# Patient Record
Sex: MALE | Race: BLACK OR AFRICAN AMERICAN | NOT HISPANIC OR LATINO | Employment: OTHER | URBAN - NONMETROPOLITAN AREA
[De-identification: names, ages, dates, MRNs, and addresses within clinical notes are randomized per-mention and may not be internally consistent; named-entity substitution may affect disease eponyms.]

---

## 2019-04-16 ENCOUNTER — TELEPHONE (OUTPATIENT)
Dept: URGENT CARE | Facility: CLINIC | Age: 34
End: 2019-04-16

## 2019-04-16 DIAGNOSIS — L03.012 PARONYCHIA OF LEFT INDEX FINGER: ICD-10-CM

## 2019-04-16 PROCEDURE — 87185 SC STD ENZYME DETCJ PER NZM: CPT | Performed by: NURSE PRACTITIONER

## 2019-04-16 PROCEDURE — 87147 CULTURE TYPE IMMUNOLOGIC: CPT | Performed by: NURSE PRACTITIONER

## 2019-04-16 PROCEDURE — 87205 SMEAR GRAM STAIN: CPT | Performed by: NURSE PRACTITIONER

## 2019-04-16 PROCEDURE — 87186 SC STD MICRODIL/AGAR DIL: CPT | Performed by: NURSE PRACTITIONER

## 2019-04-16 PROCEDURE — 87070 CULTURE OTHR SPECIMN AEROBIC: CPT | Performed by: NURSE PRACTITIONER

## 2019-04-16 RX ORDER — CLINDAMYCIN HYDROCHLORIDE 300 MG/1
300 CAPSULE ORAL 3 TIMES DAILY
Qty: 30 CAPSULE | Refills: 0 | Status: SHIPPED | OUTPATIENT
Start: 2019-04-16 | End: 2020-12-12

## 2019-04-16 RX ORDER — NAPROXEN 500 MG/1
500 TABLET ORAL 2 TIMES DAILY PRN
Qty: 30 TABLET | Refills: 0 | Status: SHIPPED | OUTPATIENT
Start: 2019-04-16 | End: 2020-12-12

## 2020-12-12 PROCEDURE — 87661 TRICHOMONAS VAGINALIS AMPLIF: CPT | Performed by: NURSE PRACTITIONER

## 2020-12-12 PROCEDURE — 87491 CHLMYD TRACH DNA AMP PROBE: CPT | Performed by: NURSE PRACTITIONER

## 2020-12-12 PROCEDURE — 87591 N.GONORRHOEAE DNA AMP PROB: CPT | Performed by: NURSE PRACTITIONER

## 2020-12-12 PROCEDURE — 87086 URINE CULTURE/COLONY COUNT: CPT | Performed by: NURSE PRACTITIONER

## 2021-02-05 ENCOUNTER — HOSPITAL ENCOUNTER (EMERGENCY)
Age: 36
Discharge: HOME | End: 2021-02-05
Attending: EMERGENCY MEDICINE
Payer: MEDICAID

## 2021-02-05 VITALS
HEART RATE: 59 BPM | OXYGEN SATURATION: 95 % | BODY MASS INDEX: 29.4 KG/M2 | HEIGHT: 71 IN | RESPIRATION RATE: 16 BRPM | DIASTOLIC BLOOD PRESSURE: 97 MMHG | TEMPERATURE: 97.7 F | SYSTOLIC BLOOD PRESSURE: 137 MMHG | WEIGHT: 210 LBS

## 2021-02-05 DIAGNOSIS — S05.02XA CORNEAL ABRASION, LEFT, INITIAL ENCOUNTER: Primary | ICD-10-CM

## 2021-02-05 PROCEDURE — 99284 EMERGENCY DEPT VISIT MOD MDM: CPT

## 2021-02-05 PROCEDURE — 6370000000 HC RX 637 (ALT 250 FOR IP): Performed by: EMERGENCY MEDICINE

## 2021-02-05 RX ORDER — POLYMYXIN B SULFATE AND TRIMETHOPRIM 1; 10000 MG/ML; [USP'U]/ML
1 SOLUTION OPHTHALMIC EVERY 4 HOURS
Qty: 1 BOTTLE | Refills: 0 | Status: SHIPPED | OUTPATIENT
Start: 2021-02-05 | End: 2021-02-12

## 2021-02-05 RX ORDER — TETRACAINE HYDROCHLORIDE 5 MG/ML
1 SOLUTION OPHTHALMIC ONCE
Status: COMPLETED | OUTPATIENT
Start: 2021-02-05 | End: 2021-02-05

## 2021-02-05 RX ADMIN — FLUORESCEIN SODIUM 1 MG: 1 STRIP OPHTHALMIC at 06:44

## 2021-02-05 RX ADMIN — TETRACAINE HYDROCHLORIDE 1 DROP: 5 SOLUTION OPHTHALMIC at 06:44

## 2021-02-05 SDOH — HEALTH STABILITY: MENTAL HEALTH: HOW OFTEN DO YOU HAVE A DRINK CONTAINING ALCOHOL?: NEVER

## 2021-02-05 ASSESSMENT — TONOMETRY
OD_IOP_MMHG: 16
IOP_HANDHELD: 1
OS_IOP_MMHG: 18

## 2021-02-05 ASSESSMENT — ENCOUNTER SYMPTOMS
EYE PAIN: 1
EYE ITCHING: 0
SINUS PRESSURE: 0
EYE DISCHARGE: 1
VOMITING: 0
RHINORRHEA: 0
EYE REDNESS: 1

## 2021-02-05 ASSESSMENT — VISUAL ACUITY: OU: 1

## 2021-02-05 ASSESSMENT — PAIN DESCRIPTION - ORIENTATION: ORIENTATION: LEFT

## 2021-02-05 ASSESSMENT — PAIN DESCRIPTION - PAIN TYPE: TYPE: ACUTE PAIN

## 2021-02-05 ASSESSMENT — PAIN DESCRIPTION - LOCATION: LOCATION: EYE

## 2021-02-05 NOTE — ED PROVIDER NOTES
[facility] EMERGENCY DEPT  eMERGENCY dEPARTMENT eNCOUnter      Pt Name: Armando Zarate  MRN: [id number]  [place] 1985  Date of evaluation: 2/5/2021  Provider: Jarrett Quiroz MD    [address] [place] [address]       Chief Complaint   Patient presents with    Eye Problem     left eye pain and redness. Possible foreign object         HISTORY OF PRESENT ILLNESS   (Location/Symptom, Timing/Onset,Context/Setting, Quality, Duration, Modifying Factors, Severity)  Note limiting factors. Armando Zarate is a 38 y.o. male who presents to the emergency department for left eye pain. Patient states that he was grinding some metal but was wearing safety glasses yesterday but concerned he possibly has a piece of metal stuck in his eye. He woke up this morning and noticed it was very red and he had some even reddish colored watery discharge. States initially his vision was a little blurry in the left eye but that has since resolved. Tells me has been diagnosed with glaucoma but he has been compliant with his drops. Wears glasses no contacts. HPI    NursingNotes were reviewed. REVIEW OF SYSTEMS    (2-9 systems for level 4, 10 or more for level 5)     Review of Systems   Constitutional: Negative for fever. HENT: Negative for rhinorrhea and sinus pressure. Eyes: Positive for pain, discharge, redness and visual disturbance. Negative for itching. Gastrointestinal: Negative for vomiting. Neurological: Negative for headaches. PAST MEDICALHISTORY   History reviewed. No pertinent past medical history. SURGICAL HISTORY     History reviewed. No pertinent surgical history. CURRENT MEDICATIONS     Previous Medications    No medications on file       ALLERGIES     Patient has no known allergies. FAMILY HISTORY     History reviewed. No pertinent family history.        SOCIAL HISTORY       Social History     Socioeconomic History    Marital status: Single     Spouse name: None    Number of children: None    Years of education: None    Highest education level: None   Occupational History    None   Social Needs    Financial resource strain: None    Food insecurity     Worry: None     Inability: None    Transportation needs     Medical: None     Non-medical: None   Tobacco Use    Smoking status: Never Smoker    Smokeless tobacco: Never Used   Substance and Sexual Activity    Alcohol use: Never     Frequency: Never    Drug use: Never    Sexual activity: None   Lifestyle    Physical activity     Days per week: None     Minutes per session: None    Stress: None   Relationships    Social connections     Talks on phone: None     Gets together: None     Attends Faith service: None     Active member of club or organization: None     Attends meetings of clubs or organizations: None     Relationship status: None    Intimate partner violence     Fear of current or ex partner: None     Emotionally abused: None     Physically abused: None     Forced sexual activity: None   Other Topics Concern    None   Social History Narrative    None       SCREENINGS    Anastasiia Coma Scale  Eye Opening: Spontaneous  Best Verbal Response: Oriented  Best Motor Response: Obeys commands  Anastasiia Coma Scale Score: 15        PHYSICAL EXAM    (up to 7 for level 4, 8 or more for level 5)     ED Triage Vitals [02/05/21 0634]   BP Temp Temp src Pulse Resp SpO2 Height Weight   (!) 137/97 97.7 °F (36.5 °C) -- 59 16 95 % 5' 11\" (1.803 m) 210 lb (95.3 kg)       Physical Exam  Vitals signs and nursing note reviewed. Constitutional:       General: He is not in acute distress. Appearance: Normal appearance. He is well-developed. He is not ill-appearing, toxic-appearing or diaphoretic. HENT:      Head: Normocephalic and atraumatic. Eyes:      General: Lids are normal. Lids are everted, no foreign bodies appreciated. Vision grossly intact. Gaze aligned appropriately. Right eye: No foreign body or discharge.          Left eye: No foreign body or discharge. Intraocular pressure: Right eye pressure is 16 mmHg. Left eye pressure is 18 mmHg. Measurements were taken using a handheld tonometer. Extraocular Movements: Extraocular movements intact. Conjunctiva/sclera:      Right eye: No hemorrhage. Left eye: Left conjunctiva is injected. No hemorrhage. Pupils: Pupils are equal, round, and reactive to light. Left eye: Corneal abrasion and fluorescein uptake present. Comments: Somewhat diffuse haziness uptake of fluorescein across cornea   Neck:      Musculoskeletal: Normal range of motion and neck supple. Trachea: No tracheal deviation. Cardiovascular:      Rate and Rhythm: Normal rate and regular rhythm. Heart sounds: Normal heart sounds. No murmur. Pulmonary:      Breath sounds: Normal breath sounds. No wheezing or rales. Musculoskeletal: Normal range of motion. Skin:     General: Skin is warm and dry. Neurological:      Mental Status: He is alert and oriented to person, place, and time. DIAGNOSTIC RESULTS             No orders to display           LABS:  Labs Reviewed - No data to display    All other labs were within normal range or not returned as of this dictation. EMERGENCY DEPARTMENT COURSE and DIFFERENTIAL DIAGNOSIS/MDM:   Vitals:    Vitals:    02/05/21 0634   BP: (!) 137/97   Pulse: 59   Resp: 16   Temp: 97.7 °F (36.5 °C)   SpO2: 95%   Weight: 210 lb (95.3 kg)   Height: 5' 11\" (1.803 m)       MDM    No FB, pressures are normal, gross vision intact, possibly had FB and due to rubbing eyes has caused corneal abrasion, place on polytrim gtt and f/u ophtho, understands return precautions      CONSULTS:  None    PROCEDURES:  Unless otherwise noted below, none     Procedures    FINAL IMPRESSION      1.  Corneal abrasion, left, initial encounter          DISPOSITION/PLAN   DISPOSITION Decision To Discharge 02/05/2021 06:58:58 AM      PATIENT REFERRED TO:  Natividad Davis MD  [id number] [phone]    Schedule an appointment as soon as possible for a visit on 2/8/2021        DISCHARGE MEDICATIONS:  New Prescriptions    TRIMETHOPRIM-POLYMYXIN B (POLYTRIM) 11423-5.1 UNIT/ML-% OPHTHALMIC SOLUTION    Place 1 drop into the left eye every 4 hours for 7 days          (Please note that portions of this note were completed with a voice recognition program.  Efforts were made to edit thedictations but occasionally words are mis-transcribed.)    Tanja Arevalo MD (electronically signed)  Attending Emergency Physician       Zacarias Acosta MD  02/05/21 3131

## 2021-08-17 ENCOUNTER — HOSPITAL ENCOUNTER (OUTPATIENT)
Dept: GENERAL RADIOLOGY | Facility: HOSPITAL | Age: 36
Discharge: HOME | End: 2021-08-17

## 2021-08-17 PROCEDURE — 73562 X-RAY EXAM OF KNEE 3: CPT

## 2021-08-17 PROCEDURE — 71101 X-RAY EXAM UNILAT RIBS/CHEST: CPT
